# Patient Record
Sex: FEMALE | Race: BLACK OR AFRICAN AMERICAN
[De-identification: names, ages, dates, MRNs, and addresses within clinical notes are randomized per-mention and may not be internally consistent; named-entity substitution may affect disease eponyms.]

---

## 2017-09-10 ENCOUNTER — HEALTH MAINTENANCE LETTER (OUTPATIENT)
Age: 32
End: 2017-09-10

## 2019-04-06 ENCOUNTER — APPOINTMENT (OUTPATIENT)
Dept: ULTRASOUND IMAGING | Facility: CLINIC | Age: 34
End: 2019-04-06
Attending: EMERGENCY MEDICINE

## 2019-04-06 ENCOUNTER — HOSPITAL ENCOUNTER (EMERGENCY)
Facility: CLINIC | Age: 34
Discharge: HOME | End: 2019-04-06
Attending: EMERGENCY MEDICINE | Admitting: EMERGENCY MEDICINE

## 2019-04-06 VITALS
BODY MASS INDEX: 37.76 KG/M2 | WEIGHT: 200 LBS | SYSTOLIC BLOOD PRESSURE: 146 MMHG | RESPIRATION RATE: 16 BRPM | OXYGEN SATURATION: 99 % | HEIGHT: 61 IN | DIASTOLIC BLOOD PRESSURE: 97 MMHG | TEMPERATURE: 98.9 F

## 2019-04-06 DIAGNOSIS — N93.9 ABNORMAL UTERINE BLEEDING: ICD-10-CM

## 2019-04-06 LAB
BASOPHILS # BLD AUTO: 0 10E9/L (ref 0–0.2)
BASOPHILS NFR BLD AUTO: 0.2 %
DIFFERENTIAL METHOD BLD: NORMAL
EOSINOPHIL # BLD AUTO: 0.1 10E9/L (ref 0–0.7)
EOSINOPHIL NFR BLD AUTO: 1.5 %
ERYTHROCYTE [DISTWIDTH] IN BLOOD BY AUTOMATED COUNT: 13.2 % (ref 10–15)
HCG SERPL QL: NEGATIVE
HCT VFR BLD AUTO: 38.8 % (ref 35–47)
HGB BLD-MCNC: 13.4 G/DL (ref 11.7–15.7)
IMM GRANULOCYTES # BLD: 0 10E9/L (ref 0–0.4)
IMM GRANULOCYTES NFR BLD: 0.2 %
LYMPHOCYTES # BLD AUTO: 1.8 10E9/L (ref 0.8–5.3)
LYMPHOCYTES NFR BLD AUTO: 20.5 %
MCH RBC QN AUTO: 29.7 PG (ref 26.5–33)
MCHC RBC AUTO-ENTMCNC: 34.5 G/DL (ref 31.5–36.5)
MCV RBC AUTO: 86 FL (ref 78–100)
MONOCYTES # BLD AUTO: 0.7 10E9/L (ref 0–1.3)
MONOCYTES NFR BLD AUTO: 7.7 %
NEUTROPHILS # BLD AUTO: 6 10E9/L (ref 1.6–8.3)
NEUTROPHILS NFR BLD AUTO: 69.9 %
NRBC # BLD AUTO: 0 10*3/UL
NRBC BLD AUTO-RTO: 0 /100
PLATELET # BLD AUTO: 228 10E9/L (ref 150–450)
RBC # BLD AUTO: 4.51 10E12/L (ref 3.8–5.2)
WBC # BLD AUTO: 8.6 10E9/L (ref 4–11)

## 2019-04-06 PROCEDURE — 99284 EMERGENCY DEPT VISIT MOD MDM: CPT | Mod: 25

## 2019-04-06 PROCEDURE — 84703 CHORIONIC GONADOTROPIN ASSAY: CPT | Performed by: EMERGENCY MEDICINE

## 2019-04-06 PROCEDURE — 85025 COMPLETE CBC W/AUTO DIFF WBC: CPT | Performed by: EMERGENCY MEDICINE

## 2019-04-06 PROCEDURE — 76830 TRANSVAGINAL US NON-OB: CPT

## 2019-04-06 RX ORDER — MEDROXYPROGESTERONE ACETATE 10 MG
20 TABLET ORAL DAILY
Qty: 42 TABLET | Refills: 0 | Status: SHIPPED | OUTPATIENT
Start: 2019-04-06 | End: 2019-04-27

## 2019-04-06 RX ORDER — IBUPROFEN 600 MG/1
600 TABLET, FILM COATED ORAL EVERY 6 HOURS PRN
COMMUNITY

## 2019-04-06 ASSESSMENT — ENCOUNTER SYMPTOMS
NAUSEA: 0
VOMITING: 0
CHILLS: 0
FEVER: 0

## 2019-04-06 ASSESSMENT — MIFFLIN-ST. JEOR: SCORE: 1534.57

## 2019-04-06 NOTE — ED AVS SNAPSHOT
Emergency Department  [address] [place] [id number]  Phone:  [phone]  Fax:  [phone]                                    Jairo Knox   MRN: [id number]    Department:   Emergency Department   Date of Visit:  4/6/2019           After Visit Summary Signature Page    I have received my discharge instructions, and my questions have been answered. I have discussed any challenges I see with this plan with the nurse or doctor.    ..........................................................................................................................................  Patient/Patient Representative Signature      ..........................................................................................................................................  Patient Representative Print Name and Relationship to Patient    ..................................................               ................................................  Date                                   Time    ..........................................................................................................................................  Reviewed by Signature/Title    ...................................................              ..............................................  Date                                               Time          22EPIC Rev 08/18

## 2019-04-06 NOTE — ED PROVIDER NOTES
"  History     Chief Complaint:  Vaginal bleeding     HPI   Jairo Knox is a 34 year old female who presents with vaginal bleeding.  The patient has a longstanding history of dysfunctional uterine bleeding for which she had a Mirena IUD placed in 2016.  This was helpful in decreasing her bleeding, although she continued to have monthly periods.  Two months ago her bleeding became more frequent, longer, and heavier.    She was seen in ob/gyn clinic 1 month ago at which time IUD removal could not be done as her IUD strings could not be visualized.  She was prescribed Provera which did slow down her bleeding however she started cramping 2 days ago and then her bleeding increased yesterday after finishing her final dose of Provera.  Pelvic ultrasound was ordered however the patient never had it done due to a family emergency.  She describes her cramps as like contractions as they are intermittent.  She has had a tubal ligation therefore does not believe she is pregnant.  Hemoglobin on 3/7/19 was 12.9.    Allergies:  No known drug allergies.     Medications:    The patient is currently on no regular medications.      Past Medical History:    Dysfunctional uterine bleeding     Past Surgical History:    Bilateral tubal ligation     Family History:    Diabetes - father  Asthma - brother   Hypertension - mother  Hyperlipidemia - mother     Social History:  Presents to the ED with her   Tobacco Use: No previous or current tobacco use.   Alcohol Use: No alcohol use.   PCP: Darryn Colon      Review of Systems   Constitutional: Negative for chills and fever.   Gastrointestinal: Negative for nausea and vomiting.   Genitourinary: Positive for menstrual problem, pelvic pain and vaginal bleeding.   All other systems reviewed and are negative.      Physical Exam   First Vitals:  BP: (!) 146/97  Heart Rate: 95  Temp: 98.9  F (37.2  C)  Resp: 16  Height: 154.9 cm (5' 1\")  Weight: 90.7 kg (200 lb)  SpO2: 99 %    Physical " Exam    Physical Exam   Constitutional:  Patient is oriented to person, place, and time. They appear well-developed and well-nourished.    HENT:   Mouth/Throat:   Oropharynx is clear and moist.   Eyes:    Conjunctivae normal and EOM are normal. Pupils are equal, round, and reactive to light.   Neck:    Normal range of motion.   Cardiovascular: Normal rate, regular rhythm and normal heart sounds.  Exam reveals no gallop and no friction rub.  No murmur heard.  Pulmonary/Chest:  Effort normal and breath sounds normal. Patient has no wheezes. Patient has no rales.   Abdominal:   Soft. Bowel sounds are normal. Patient exhibits no mass. There is no focal reproducible tenderness but she indicates pelvic cramping. There is no rebound and no guarding.   Musculoskeletal:  Normal range of motion. Patient exhibits no edema.   Neurological:   Patient is alert and oriented to person, place, and time. Patient has normal strength. No cranial nerve deficit or sensory deficit. GCS 15  Skin:   Skin is warm and dry. No rash noted. No erythema.   Psychiatric:   Patient has a normal mood and affect. Patient's behavior is normal. Judgment and thought content normal.         Emergency Department Course     Imaging:  Pelvic ultrasound:  1. An IUD is not present.  2. Otherwise unremarkable pelvic ultrasound.   Report per radiology.     Radiographic findings were communicated with the patient who voiced understanding of the findings.    Laboratory:  CBC: WNL (WBC 8.6, HGB 13.4, )   HCG Qualitative, Blood: negative       Emergency Department Course:  Nursing notes and vitals reviewed.  I performed an exam of the patient as documented above.     Blood was drawn from the patient. This was sent for laboratory testing, findings above.      The patient was sent for a pelvic ultrasound while in the emergency department, findings above.      Findings and plan explained to the patient. Patient discharged home with instructions regarding  supportive care, medications, and reasons to return. The importance of close follow-up was reviewed. The patient was prescribed Provera    Impression & Plan      Medical Decision Making:  Tab Knox is a 34-year-old female presenting with abnormal uterine bleeding.  She just finished Provera this week and her bleeding increased.  Ultrasound was performed today as she was planning on getting that last month but did not have a chance.  Ultrasound today shows no fibroids or acute abnormalities, in addition it shows no evidence of IUD.  At this point the patient appears to have abnormal uterine bleeding.  I will write her for Provera.  She will follow-up with her OB this week.  She desires hysterectomy.  I referred her back to her OB to discuss different options at this time.  She is hemodynamically stable.  Hemoglobin is noted to be within normal limits as well.  Patient is comfortable with this plan.    Diagnosis:    ICD-10-CM    1. Abnormal uterine bleeding N93.9        Disposition:  Discharged to home.     Discharge Medications:     Medication List      Started    medroxyPROGESTERone 10 MG tablet  Commonly known as:  PROVERA  20 mg, Oral, DAILY            I, Deisi Parsons, am serving as a scribe on 4/6/2019 at 10:06 AM to personally document services performed by Dr. Gonzalez based on my observations and the provider's statements to me.    Deisi Parsons  4/6/2019    EMERGENCY DEPARTMENT       Karine Gonzalez MD  04/06/19 [id number]